# Patient Record
Sex: FEMALE | Race: WHITE | NOT HISPANIC OR LATINO | URBAN - METROPOLITAN AREA
[De-identification: names, ages, dates, MRNs, and addresses within clinical notes are randomized per-mention and may not be internally consistent; named-entity substitution may affect disease eponyms.]

---

## 2021-05-24 ENCOUNTER — RECORDS - HEALTHEAST (OUTPATIENT)
Dept: ADMINISTRATIVE | Facility: CLINIC | Age: 61
End: 2021-05-24

## 2021-05-25 ENCOUNTER — RECORDS - HEALTHEAST (OUTPATIENT)
Dept: ADMINISTRATIVE | Facility: CLINIC | Age: 61
End: 2021-05-25

## 2021-05-26 ENCOUNTER — RECORDS - HEALTHEAST (OUTPATIENT)
Dept: ADMINISTRATIVE | Facility: CLINIC | Age: 61
End: 2021-05-26

## 2024-05-15 ENCOUNTER — TRANSFERRED RECORDS (OUTPATIENT)
Dept: HEALTH INFORMATION MANAGEMENT | Facility: CLINIC | Age: 64
End: 2024-05-15

## 2024-05-24 ENCOUNTER — TRANSCRIBE ORDERS (OUTPATIENT)
Dept: OTHER | Age: 64
End: 2024-05-24

## 2024-05-24 DIAGNOSIS — M06.9 RA (RHEUMATOID ARTHRITIS) (H): Primary | ICD-10-CM

## 2024-12-19 ENCOUNTER — OFFICE VISIT (OUTPATIENT)
Dept: RHEUMATOLOGY | Facility: CLINIC | Age: 64
End: 2024-12-19
Payer: COMMERCIAL

## 2024-12-19 ENCOUNTER — LAB (OUTPATIENT)
Dept: LAB | Facility: CLINIC | Age: 64
End: 2024-12-19
Payer: COMMERCIAL

## 2024-12-19 VITALS
WEIGHT: 166 LBS | RESPIRATION RATE: 16 BRPM | SYSTOLIC BLOOD PRESSURE: 104 MMHG | DIASTOLIC BLOOD PRESSURE: 71 MMHG | HEART RATE: 94 BPM | OXYGEN SATURATION: 97 %

## 2024-12-19 DIAGNOSIS — M19.90 INFLAMMATORY ARTHRITIS: ICD-10-CM

## 2024-12-19 DIAGNOSIS — M06.9 RHEUMATOID ARTHRITIS, INVOLVING UNSPECIFIED SITE, UNSPECIFIED WHETHER RHEUMATOID FACTOR PRESENT (H): Chronic | ICD-10-CM

## 2024-12-19 LAB
ALBUMIN SERPL BCG-MCNC: 4.6 G/DL (ref 3.5–5.2)
ALP SERPL-CCNC: 93 U/L (ref 40–150)
ALT SERPL W P-5'-P-CCNC: 39 U/L (ref 0–50)
ANION GAP SERPL CALCULATED.3IONS-SCNC: 11 MMOL/L (ref 7–15)
AST SERPL W P-5'-P-CCNC: 30 U/L (ref 0–45)
BILIRUB SERPL-MCNC: 0.6 MG/DL
BUN SERPL-MCNC: 14.4 MG/DL (ref 8–23)
CALCIUM SERPL-MCNC: 9.7 MG/DL (ref 8.8–10.4)
CHLORIDE SERPL-SCNC: 104 MMOL/L (ref 98–107)
CREAT SERPL-MCNC: 0.93 MG/DL (ref 0.51–0.95)
CRP SERPL-MCNC: <3 MG/L
EGFRCR SERPLBLD CKD-EPI 2021: 69 ML/MIN/1.73M2
ERYTHROCYTE [DISTWIDTH] IN BLOOD BY AUTOMATED COUNT: 12.6 % (ref 10–15)
ERYTHROCYTE [SEDIMENTATION RATE] IN BLOOD BY WESTERGREN METHOD: 5 MM/HR (ref 0–30)
GLUCOSE SERPL-MCNC: 84 MG/DL (ref 70–99)
HCO3 SERPL-SCNC: 26 MMOL/L (ref 22–29)
HCT VFR BLD AUTO: 44.8 % (ref 35–47)
HGB BLD-MCNC: 14.8 G/DL (ref 11.7–15.7)
MCH RBC QN AUTO: 30.3 PG (ref 26.5–33)
MCHC RBC AUTO-ENTMCNC: 33 G/DL (ref 31.5–36.5)
MCV RBC AUTO: 92 FL (ref 78–100)
PLATELET # BLD AUTO: 273 10E3/UL (ref 150–450)
POTASSIUM SERPL-SCNC: 3.9 MMOL/L (ref 3.4–5.3)
PROT SERPL-MCNC: 7.1 G/DL (ref 6.4–8.3)
RBC # BLD AUTO: 4.89 10E6/UL (ref 3.8–5.2)
RHEUMATOID FACT SERPL-ACNC: <10 IU/ML
SODIUM SERPL-SCNC: 141 MMOL/L (ref 135–145)
WBC # BLD AUTO: 5.4 10E3/UL (ref 4–11)

## 2024-12-19 RX ORDER — ESOMEPRAZOLE MAGNESIUM 40 MG/1
2 CAPSULE, DELAYED RELEASE ORAL DAILY
COMMUNITY

## 2024-12-19 RX ORDER — FAMOTIDINE 40 MG/1
40 TABLET, FILM COATED ORAL AT BEDTIME
COMMUNITY
Start: 2024-11-20

## 2024-12-19 RX ORDER — VITAMIN B COMPLEX
1 TABLET ORAL DAILY
COMMUNITY

## 2024-12-19 RX ORDER — MULTIVITAMIN
2 TABLET ORAL DAILY
COMMUNITY

## 2024-12-19 RX ORDER — TIRZEPATIDE 7.5 MG/.5ML
7.5 INJECTION, SOLUTION SUBCUTANEOUS
COMMUNITY

## 2024-12-19 RX ORDER — ESTRADIOL 10 UG/1
10 INSERT VAGINAL
COMMUNITY
Start: 2024-03-29

## 2024-12-19 RX ORDER — LEVOTHYROXINE SODIUM 88 UG/1
88 TABLET ORAL DAILY
COMMUNITY

## 2024-12-19 ASSESSMENT — PAIN SCALES - GENERAL: PAINLEVEL_OUTOF10: SEVERE PAIN (6)

## 2024-12-19 NOTE — PATIENT INSTRUCTIONS
Diagnosis  Osteoarthritis, hands, mid feet, left ankle  Query active inflammatory arthritis in hands/feet  Chronic dry eye and dry mouth, hair loss: Autoimmunity certainly could be contributing    Plan:  1.  Blood work including comprehensive metabolic panel, CBC, rheumatoid factor, SANJIV, cyclic citrullinated peptide antibodies inflammatory markers; x-rays of hands and feet to better define possible autoimmune or inflammatory component to chronic joint symptoms.  2.  Consider trials of immunomodulatory therapy including adalimumab (Humira) versus sulfasalazine (oral, traditional arthritis medication, but does contain sulfa component)  3.  Acquire records from [facility] for review.

## 2024-12-19 NOTE — PROGRESS NOTES
Rheumatology Clinic Visit  [facility]  Elmer Alvarez M.D.     Opal Shannon MRN# [id number]   YOB: 1960 Age: 64 year old   Date of Visit: 12/19/2024  Primary care provider: No primary care provider on file.          Assessment and Plan:     # Rheumatoid arthritis by history, long-standing  # Osteoarthritis: hands, midfeet  # L ankle pain, chronic    Patient relates chronic, intermittent, low/moderate intensity multifocal joint pain with limited morning stiffness and visible joint swelling.  Exam shows changes of osteoarthritis in the hands, and scattered joint tenderness at MCPs and MTPs.    Data:   On December 19, 2024, comprehensive metabolic panel, CBC were normal; sedimentation rate and CRP were normal; rheumatoid factor, cyclic citrullinated peptide antibodies, SANJIV were negative.  Outside facility in September 2024:  Hemoglobin A1c was 5.5.  TSH was 0.69.  Ferritin was 127.  CK was 140, normal.  In 2022, tissue transglutaminase thyroglobulin and thyroid peroxidase antibodies negative    Discussion: Chronic, waxing and waning multifocal joint pain likely has a strong degenerative/osteoarthritis component.  Pain in the hands and fingers is frequently use associated, but occasionally is associated with swelling and stiffness.  The latter symptoms suggest a component of inflammatory arthritis.  Descriptions of former swollen, painful, and difficult to use joints in the hands occurring decades ago are convincing for an inflammatory component.  However, most of the symptoms recently are more suggestive of noninflammatory etiology.    I think that renewal of autoimmune serologies, inflammatory markers, and plain films of the hands and feet, will provide useful information in determining whether therapeutic focus should be targeting inflammation/autoimmunity, or should be solely on pain relief and nonpharmacologic aids for joint comfort and function.    In the event that immunomodulatory  therapy is warranted, I would avoid hydroxychloroquine, due to report of past documentation of retinal toxicity with that medication.  I do not see a reason to avoid methotrexate based on hepatic function and CBC testing; however, patient described past discomfort with methotrexate due to potential side effects.  Sulfasalazine could be an option, but patient had experienced non anaphylactic drug reaction to sulfa antibiotics in the past.  For the present, I recommend as needed use of acetaminophen 1000 mg up to 3 times daily as needed; Celebrex 100 mg once or twice daily may be used for residual joint pain.    We discussed during visit:    Diagnosis  Osteoarthritis, hands, mid feet, left ankle  Qu active inflammatory arthritis in hands/feet  Chronic dry eye and dry mouth, hair loss: Autoimmunity certainly could be contributing    Plan:  1.  Blood work including comprehensive metabolic panel, CBC, rheumatoid factor, SANJIV, cyclic citrullinated peptide antibodies inflammatory markers; x-rays of hands and feet to better define possible autoimmune or inflammatory component to chronic joint symptoms.  2.  Consider trials of immunomodulatory therapy including adalimumab (Humira) versus sulfasalazine (oral, traditional arthritis medication, but does contain sulfa component)  3.  Acquire records from [facility] for review.    RTC 1 month    On the day of the encounter, a total of 75 minutes was spent in chart review, and in counseling and coordination of care, regarding the patient's complex medical problem of osteoarthritis, inflammatory arthritis, history of high risk medication use.    The longitudinal plan of care for the diagnosis(es)/condition(s) as documented were addressed during this visit. Due to the added complexity in care, I will continue to support Opal in the subsequent management and with ongoing continuity of care.    Orders Placed This Encounter   Procedures    XR Hand Bilateral 1  "View    X-ray bl Foot 3+ views    Rheumatoid factor    Cyclic Citrullinated Peptide Antibody IgG    Anti Nuclear Felicitas IgG by IFA with Reflex    Comprehensive metabolic panel    Erythrocyte sedimentation rate auto    CRP inflammation    CBC with platelets       Elmer Alvarez MD  Staff Rheumatologist, [facility]         History of Present Illness:   Opal Shannon presents for consultation about rheumatoid arthritis.  Referred by provider Santiago.    Initial history December 19, 2024:    Patient was seen by provider Santiago in May 2024.  At that visit, history of multifocal joint pain including ankle, hip, low back, and thoracic pain was described.  Patient had limited ambulation due to prolonged recovery from left subtalar ankle fusion in July 2023. Rheumatoid arthritis previous diagnosis was noted, reportedly diagnosed when patient was in her early 20s without medication or other management for many years. History of improvement in joint pain with steroid, but not with Celebrex.    Today she reports that after delivery of a baby in her 20s, she developed severe pain and swelling in her hands and feet. +severe early morning stiffness and swelling.  Even before pregnancy, she developed lumps and pain in the soles of her feet.    She reports that for years, she was treated with hydroxychloroquine and celebrex by Dr. Campoverde (records not available for review today). She had significant improvement initially. However, hydroxychloroquine was stopped ~ decade ago due to retinal toxicity. Methotrexate was not offered because of \"liver test abnormalities\". She has not used DMARDs for at least 8 years.    For years, she had excruciating L neck pain and low back pain, midline, non-radiating. She had \"nerve ablation\" in both neck and low back within the past year. Pain now improved.    She had torn labrums repaired in both hips. Repair on the left was successful, the right less so in terms of long term pain relief.    Overall, " "she has not been in as much pain as she did before menopause in the last 3-5 years. She notes intermittent diffuse joint aches in knuckles and fingers ache, notable with repetitive use and with weather change. Visible joint swelling is variable and subtle.    In the mornings, walking is painful, significantly in forefeet. Pain equal on both sides. On bad days, the foot discomfort lasts hours.    She had L ankle pain throughout childhood, worse with activity. In the past 18 months, physical activity has been limited by ankle pain. She relates having bone spur removal and repeat subtalar fusion 9-2024. Good results so far, although physical activity intensity has not returned to pre-op.           Review of Systems:     Constitutional: wt has increased in the last 5 years--147 lbs up to 170; she had started zepbound in 9-2024, and wt has decreased. Appetite has decreased.  Skin: increasing hairloss--had been told she had alopecia. There is heavy hair in the drain. Dermatology did not suggest an autoimmune hypothesis. Fingernails break and peel. +longstanding \"eczema\" on her foot and chest,responsive to topical therapy.  Eyes: frequent dry, more painful  Ears/Nose/Throat: dry mouth, drinks water all night, food gets stuck, +frequent cavities  Respiratory: No cough, portillo  Cardiovascular: hands get painful with cold exposure; no fingertip sores;  there is white discoloration of fingertips with cold.  Gastrointestinal: negative  Genitourinary: negative  Musculoskeletal: negative  Neurologic: negative  Psychiatric: negative  Hematologic/Lymphatic/Immunologic: negative  Endocrine: negative         Active Problem List:   There are no active problems to display for this patient.           Past Medical History:   No past medical history on file.  No past surgical history on file.    Status post bilateral labral hip repairs  July 2023 left subtalar ankle fusion; hardware needed to be replaced in December 2023  -autoimmunity: long " "term hypothyroidism.  --hysterectomy age 40; menopausal by age 46.  --transaminase elevation, not explained.  --\"bleeding disorder\", she had bleeding after hysterectomy  -- Hydroxychloroquine retinal toxicity, diagnosed approximately 2010       Social History:     Social History     Socioeconomic History    Marital status:      Spouse name: Not on file    Number of children: Not on file    Years of education: Not on file    Highest education level: Not on file   Occupational History    Not on file   Tobacco Use    Smoking status: Never    Smokeless tobacco: Never   Vaping Use    Vaping status: Never Used   Substance and Sexual Activity    Alcohol use: Not on file    Drug use: Not on file    Sexual activity: Not on file   Other Topics Concern    Not on file   Social History Narrative    Not on file     Social Drivers of Health     Financial Resource Strain: Not At Risk (1/31/2024)    Received from [facility]    Financial Resource Strain     Is it hard for you to pay for the very basics like food, housing, medical care or heating?: No   Food Insecurity: Not At Risk (1/31/2024)    Received from [facility]    Food Insecurity     Does your food run out before you have the money to buy more?: No   Transportation Needs: Not At Risk (1/31/2024)    Received from [facility]    Transportation Needs     Does a lack of transportation keep you from your medical appointments or from getting your medications?: No   Physical Activity: Not on file   Stress: Not on file   Social Connections: Not on file   Interpersonal Safety: Not on file   Housing Stability: Not on file     Works as a therapist  Drinks < 1 X weekly  Never smoker.       Family History:   No family history on file.    Paternal aunt/uncle had RA       Allergies:     Allergies   Allergen Reactions    Amoxicillin-Pot Clavulanate Hives    Oxycodone GI Disturbance    Penicillins Hives    Sulfamethoxazole Itching    Trimethoprim Itching    Adhesive Tape " Rash    Morphine And Codeine Other (See Comments) and Rash     GI upset            Medications:     Current Outpatient Medications   Medication Sig Dispense Refill    CALCIUM PO Take 2 tablets by mouth daily.      esomeprazole (NEXIUM) 40 MG DR capsule Take 2 capsules by mouth daily.      estradiol (VAGIFEM) 10 MCG TABS vaginal tablet Place 10 mcg vaginally three times a week.      famotidine (PEPCID) 40 MG tablet Take 40 mg by mouth at bedtime.      levothyroxine (SYNTHROID/LEVOTHROID) 88 MCG tablet Take 88 mcg by mouth daily.      multivitamin w/minerals (MULTI-VITAMIN) tablet Take 2 tablets by mouth daily.      Vitamin D3 (CHOLECALCIFEROL) 25 mcg (1000 units) tablet Take 1 tablet by mouth daily.      ZEPBOUND 7.5 MG/0.5ML prefilled pen Inject 7.5 mg subcutaneously every 7 days.              Physical Exam:   Blood pressure 104/71, pulse 94, resp. rate 16, weight 75.3 kg (166 lb), SpO2 97%.  Wt Readings from Last 6 Encounters:   12/19/24 75.3 kg (166 lb)     There is no height or weight on file to calculate BMI.  Constitutional: well-developed, appearing stated age; vigorous appearing  Eyes: nl EOM, PERRLA, vision, conjunctiva, sclera  ENT: nl external ears, nose, hearing, lips, teeth, gums, throat  No mucous membrane lesions, normal saliva pool  Neck: no mass or thyroid enlargement  Resp: Breathing unlabored  Lymph: no cervical, supraclavicular, inguinal or epitrochlear nodes  MS: The TMJ, neck, shoulder, elbow, wrist, MCP/PIP/DIP, spine, hip, knee, ankle, and foot MTP/IP joints were examined.  Hands showed subtle bony enlargement and angulation at multiple PIPs and DIPs.  No tenderness or palpable synovitis in rheumatoid arthritis target joints of MCPs, PIPs, or MTPs.  Skin: no alopecia, rash, nodules or lesions  Neuro: nl cranial nerves, strength, sensation, DTRs.   Psych: nl judgement, orientation, memory, affect.         Data:     @       No data to display                 ,  ,  ,  ,  ,  ,  ,  ,  ,  ,  ,  ,  ,   ,  ,  ,  ,  ,  ,  ,  ,  ,  ,  ,  ,  ,  ,  ,  ,  ,  ,  ,  ,  ,  ,  ,  ,  ,  ,  ,  ,  ,  ,  ,

## 2024-12-19 NOTE — NURSING NOTE
Chief Complaint   Patient presents with    New Patient     RA (rheumatoid arthritis)       Vitals:    12/19/24 0748   BP: 104/71   BP Location: Left arm   Patient Position: Sitting   Cuff Size: Adult Large   Pulse: 94   Resp: 16   SpO2: 97%   Weight: 75.3 kg (166 lb)       There is no height or weight on file to calculate BMI.      Celestino Long MA

## 2025-01-03 ENCOUNTER — ANCILLARY PROCEDURE (OUTPATIENT)
Dept: GENERAL RADIOLOGY | Facility: CLINIC | Age: 65
End: 2025-01-03
Attending: INTERNAL MEDICINE
Payer: COMMERCIAL

## 2025-01-03 DIAGNOSIS — M19.90 INFLAMMATORY ARTHRITIS: ICD-10-CM

## 2025-01-03 PROCEDURE — 73120 X-RAY EXAM OF HAND: CPT | Mod: TC | Performed by: RADIOLOGY

## 2025-01-03 PROCEDURE — 73630 X-RAY EXAM OF FOOT: CPT | Mod: TC | Performed by: RADIOLOGY

## 2025-01-21 ENCOUNTER — TELEPHONE (OUTPATIENT)
Dept: RHEUMATOLOGY | Facility: CLINIC | Age: 65
End: 2025-01-21
Payer: COMMERCIAL

## 2025-01-21 NOTE — TELEPHONE ENCOUNTER
Patient calling for lab results from 12/19/24.  Does she have RA?  She states you saw her as a second opinion.     Chelsey Rogers RN

## 2025-01-21 NOTE — TELEPHONE ENCOUNTER
[facility] Call Center    Phone Message    May a detailed message be left on voicemail: yes     Reason for Call: Other: Per patient, she received an unclear letter from [facility] saying xray's show non inflammatory RA and no change in medications. Patient has only seen Dr. Alvarez once and is just wanting some clarification on the letter info. She also hasn't seen her lab results so would like someone to call her back at [phone] to review.     Action Taken: Message routed to:  Other: RHEUM    Travel Screening: Not Applicable     Date of Service: 1/21/25

## 2025-01-21 NOTE — TELEPHONE ENCOUNTER
Thank you for the questions. I reviewed the lab results, which were reassuring.  Taking all the lab, xray and clincal information together, I do not find evidence of active inflammatory (rheumatoid) arthritis.   Inflammatory probably was present in the distant past, but thankfully, it has not resulted in long term joint damage.  Current joint pain symptoms are likely due to osteoarthritis (non-inflammatory). I recommend continuing to use acetaminophen, and trial of celebrex, as suggested in my  assessment and plan.  I will be happy to discuss further in clinic, and it would be great to see prior Rheum records at/before that visit.  EJP